# Patient Record
Sex: MALE | Race: WHITE
[De-identification: names, ages, dates, MRNs, and addresses within clinical notes are randomized per-mention and may not be internally consistent; named-entity substitution may affect disease eponyms.]

---

## 2020-03-23 ENCOUNTER — HOSPITAL ENCOUNTER (EMERGENCY)
Dept: HOSPITAL 25 - UCEAST | Age: 20
Discharge: HOME | End: 2020-03-23
Payer: COMMERCIAL

## 2020-03-23 DIAGNOSIS — Z91.013: ICD-10-CM

## 2020-03-23 DIAGNOSIS — Z48.1: ICD-10-CM

## 2020-03-23 DIAGNOSIS — K04.7: Primary | ICD-10-CM

## 2020-03-23 PROCEDURE — G0463 HOSPITAL OUTPT CLINIC VISIT: HCPCS

## 2020-03-23 PROCEDURE — 99202 OFFICE O/P NEW SF 15 MIN: CPT

## 2020-03-23 NOTE — UC
HPI Wound/Suture Re-check





- HPI Summary


HPI Summary: 


19-year-old male presenting with need for suture removal from his lower front 

gums.  I spoke with the patient's oral surgeon earlier who states the stitches 

were placed 2 weeks ago and need to be taken out to decrease risk of infection.

  Patient denies fever and chills.  Denies pain but states the sutures feel 

tight.  Denies any drainage that he has noticed.  Denies bleeding.  Denies 

swelling.  Patient states he was on approximately 10 days of amoxicillin after 

the sutures were placed.








- History Of Current Complaint


Chief Complaint: UCLaceration


Stated Complaint: SUTURE REMOVAL


Hx Obtained From: Patient


Pain Intensity: 1


Pain Scale Used: 0-10 Numeric





- Allergies/Home Medications


Allergies/Adverse Reactions: 


 Allergies











Allergy/AdvReac Type Severity Reaction Status Date / Time


 


scallops Allergy  Vomiting Verified 03/23/20 10:21











Home Medications: 


 Home Medications





Amoxicillin PO (*) [Amoxicillin 875 MG (*)] 875 mg PO BID #10 tab 03/23/20 [Rx]


Butalbit/Acetamin/Caff/Codeine 1 tab PO DAILY PRN 03/23/20 [History Confirmed 03 /23/20]


Fluoxetine HCl [Prozac] 200 mg PO DAILY 03/23/20 [History Confirmed 03/23/20]


Naproxen [Naprosyn 500 mg tab] 1 tab PO DAILY PRN 03/23/20 [History Confirmed 03 /23/20]


busPIRone TAB* [Buspar TAB*] 1 tab PO DAILY 03/23/20 [History Confirmed 03/23/20

]











PMH/Surg Hx/FS Hx/Imm Hx





- Surgical History


Surgical History: Yes


Surgery Procedure, Year, and Place: [id number]





- Social History


Alcohol Use: Occasionally


Substance Use Type: Marijuana


Smoking Status (MU): Never Smoked Tobacco





Review of Systems


All Other Systems Reviewed And Are Negative: Yes


Constitutional: Positive: Negative


Skin: Positive: Negative


ENT: Positive: Dental Pain - sutures lower gums that "feel tight" and need 

removed


Respiratory: Positive: Negative


Cardiovascular: Positive: Negative


Gastrointestinal: Positive: Negative


Musculoskeletal: Positive: Negative


Neurological/Mental Status: Positive: Negative





Physical Exam





- Summary


Physical Exam Summary: 


Vital Signs Reviewed: Yes


A+Ox3, no distress


Eyes: Conjunctiva Clear


ENT: Hearing grossly normal


Dental exam: 4 intact sutures noted in lower vestibule, nonbleeding, scant 

purulent drainage from one suture, no fluctuance, mild TTP


neck: supple


Respiratory: Positive: No respiratory distress, No accessory muscle use


Cardiovascular: skin color reflect adequate perfusion


Musculoskeletal Exam: STAPLES x 4 without difficulty


Neurological: Positive: Alert, ambulatory without difficulty


Psychological: Positive: age appropriate behavior


Skin: Positive: no rash, no ecchymosis








Vital Signs: 


 Initial Vital Signs











Temp  97.9 F   03/23/20 10:16


 


Pulse  75   03/23/20 10:16


 


Resp  16   03/23/20 10:16


 


BP  143/80   03/23/20 10:16


 


Pulse Ox  96   03/23/20 10:16














Course/Dx





- Course


Course Of Treatment: 


I removed 5 sutures from the patient's lower gums. There was scant drainage 

noted from one of the suture so I placed the patient on amoxicillin again x5 

days. Instructed to continue with symptomatic treatment and to return with any 

new or worsening symptoms. Patient voiced understanding and agreed with 

treatment plan.








- Diagnosis


Provider Diagnosis: 


 Encounter for removal of sutures, Dental infection








Discharge ED





- Sign-Out/Discharge


Documenting (check all that apply): Patient Departure


All imaging exams completed and their final reports reviewed: No Studies





- Discharge Plan


Condition: Stable


Disposition: HOME


Prescriptions: 


Amoxicillin PO (*) [Amoxicillin 875 MG (*)] 875 mg PO BID #10 tab


Referrals: 


No Primary Care Phys,NOPCP [Primary Care Provider] - 


Additional Instructions: 


Your sutures were removed today.


Take amoxicillin as prescribed.


You may gargle with warm salt water twice daily.


You may take over the counter pain medications as directed for pain relief.


Return if you experience any new or worsening symptoms.





- Billing Disposition and Condition


Condition: STABLE


Disposition: Home